# Patient Record
Sex: FEMALE | Race: WHITE
[De-identification: names, ages, dates, MRNs, and addresses within clinical notes are randomized per-mention and may not be internally consistent; named-entity substitution may affect disease eponyms.]

---

## 2020-03-15 ENCOUNTER — HOSPITAL ENCOUNTER (EMERGENCY)
Dept: HOSPITAL 62 - ER | Age: 30
Discharge: HOME | End: 2020-03-15
Payer: SELF-PAY

## 2020-03-15 VITALS — DIASTOLIC BLOOD PRESSURE: 96 MMHG | SYSTOLIC BLOOD PRESSURE: 128 MMHG

## 2020-03-15 DIAGNOSIS — R50.9: ICD-10-CM

## 2020-03-15 DIAGNOSIS — F17.200: ICD-10-CM

## 2020-03-15 DIAGNOSIS — J02.9: Primary | ICD-10-CM

## 2020-03-15 PROCEDURE — 99283 EMERGENCY DEPT VISIT LOW MDM: CPT

## 2020-03-15 PROCEDURE — 87070 CULTURE OTHR SPECIMN AEROBIC: CPT

## 2020-03-15 PROCEDURE — 96372 THER/PROPH/DIAG INJ SC/IM: CPT

## 2020-03-15 PROCEDURE — 87880 STREP A ASSAY W/OPTIC: CPT

## 2020-03-15 NOTE — ER DOCUMENT REPORT
HPI





- HPI


Patient complains to provider of: Sore throat fever


Time Seen by Provider: 03/15/20 18:54


Onset: Other - Thursday


Pain Level: 1


Context: 





29-year-old female presents emergency department with complaints of sore throat 

and fever since Thursday.  She reports she has been exposed to strep.  Denies 

vomiting reports some diarrhea.  Patient reports she is eating drinking voiding 

without problems.  Reports it hurts to swallow.  Reports taking Tylenol Motrin 

for the fever.  Reports last Tylenol was a couple hours ago and last Motrin 8 

hours ago.


Associated Symptoms: Fever, Sore throat


Exacerbated by: Denies


Relieved by: Denies


Similar symptoms previously: No


Recently seen / treated by doctor: No





- CONSTITUTIONAL


Constitutional: DENIES: Fever, Chills





- REPRODUCTIVE


Reproductive: DENIES: Pregnant:





Past Medical History





- General


Information source: Patient





- Social History


Smoking Status: Current Every Day Smoker


Chew tobacco use (# tins/day): No


Frequency of alcohol use: None


Drug Abuse: None


Lives with: Family


Family History: None


Patient has suicidal ideation: No


Patient has homicidal ideation: No





- Medical History


Medical History: Negative


Surgical Hx: Negative





Vertical Provider Document





- CONSTITUTIONAL


Agree With Documented VS: Yes


Exam Limitations: No Limitations


General Appearance: WD/WN, No Apparent Distress





- INFECTION CONTROL


TRAVEL OUTSIDE OF THE [place] IN LAST 30 DAYS: No





- HEENT


HEENT: Atraumatic, Normocephalic, Pharyngeal Erythema - No exudate good airway 

mouth wide no trismus.  negative: Conjuctival Injection, Pharyngeal Exudate, 

Tympanic Membrane Red, Tympanic Membrane Bulging





- NECK


Neck: Normal Inspection, Supple.  negative: Lymphadenopathy-Left, 

Lymphadenopathy-Right





- RESPIRATORY


Respiratory: Breath Sounds Normal, No Respiratory Distress





- CARDIOVASCULAR


Cardiovascular: Regular Rate, Regular Rhythm





- MUSCULOSKELETAL/EXTREMETIES


Musculoskeletal/Extremeties: MAEW, FROM





- NEURO


Level of Consciousness: Awake, Alert, Appropriate


Motor/Sensory: No Motor Deficit





- DERM


Integumentary: Warm, Dry, No Rash





Course





- Re-evaluation


Re-evalutation: 





03/15/20 19:02


29-year-old presents with sore throat fever for the past few days.  Reports she 

is taken Tylenol Motrin.  Reports last Tylenol was a couple hours ago and last 

Motrin was 8 hours ago.


03/15/20 20:09


Strep test was negative.  Patient was to be treated with penicillin Decadron due

to symptoms and due to recent exposure to strep.  She was instructed on this 

instructed on throat culture pending.  Patient was instructed to follow-up with 

her primary care return here for any trouble swallowing or concerns.  She 

verbalized understanding.











Laboratory











  03/15/20





  19:01


 


Group A Strep Rapid  NEGATIVE














- Vital Signs


Vital signs: 


                                        











Temp Pulse Resp BP Pulse Ox


 


 98 F   105 H  16   128/96 H  100 


 


 03/15/20 18:55  03/15/20 18:55  03/15/20 18:55  03/15/20 18:55  03/15/20 18:55














Discharge





- Discharge


Clinical Impression: 


 Fever, Sore throat





Condition: Stable


Disposition: HOME, SELF-CARE


Instructions:  Fever ([facility]), Use of Over-The-Counter Ibuprofen ([facility]), Penicillins

([facility]), Sore Throat ([facility]), Steroid Medication Injection


Additional Instructions: 


*You have been evaluated for a sore throat, fever, strep exposure


*Your strep test was negative.  A throat culture is pending.  You may be 

contacted in 3 to 4 days should you need a different antibiotic.


*You have received an injection of penicillin and steroids


*In the meantime gargle with warm salt water and suck on throat lozenges


*Change her toothbrush in 2 days.


*Do not let anyone drink/eat after you


*Good hand washing, push fluids stay well-hydrated


*Follow-up with a primary care provider within 1 week for recheck


*Return to ED for worsening condition change, needs, unable to swallow, concerns


Forms:  Elevated Blood Pressure, Return to Work

## 2020-05-21 ENCOUNTER — HOSPITAL ENCOUNTER (OUTPATIENT)
Dept: HOSPITAL 62 - RDC | Age: 30
End: 2020-05-21
Attending: NURSE PRACTITIONER
Payer: COMMERCIAL

## 2020-05-21 VITALS — DIASTOLIC BLOOD PRESSURE: 58 MMHG | SYSTOLIC BLOOD PRESSURE: 126 MMHG

## 2020-05-21 DIAGNOSIS — Z20.828: Primary | ICD-10-CM

## 2020-05-21 DIAGNOSIS — R51: ICD-10-CM

## 2020-05-21 DIAGNOSIS — R19.7: ICD-10-CM

## 2020-05-21 DIAGNOSIS — R05: ICD-10-CM

## 2020-05-21 DIAGNOSIS — R50.9: ICD-10-CM

## 2020-05-21 DIAGNOSIS — R09.89: ICD-10-CM

## 2020-05-21 DIAGNOSIS — M79.10: ICD-10-CM

## 2020-05-21 DIAGNOSIS — J06.9: ICD-10-CM

## 2020-05-21 DIAGNOSIS — J02.9: ICD-10-CM

## 2020-05-21 DIAGNOSIS — F17.200: ICD-10-CM

## 2020-05-21 DIAGNOSIS — R06.02: ICD-10-CM

## 2020-05-21 LAB
A TYPE INFLUENZA AG: NEGATIVE
B INFLUENZA AG: NEGATIVE

## 2020-05-21 PROCEDURE — 99211 OFF/OP EST MAY X REQ PHY/QHP: CPT

## 2020-05-21 PROCEDURE — 87070 CULTURE OTHR SPECIMN AEROBIC: CPT

## 2020-05-21 PROCEDURE — 87880 STREP A ASSAY W/OPTIC: CPT

## 2020-05-21 PROCEDURE — 87804 INFLUENZA ASSAY W/OPTIC: CPT

## 2020-05-21 PROCEDURE — 87635 SARS-COV-2 COVID-19 AMP PRB: CPT

## 2020-05-21 NOTE — ER RDC ASSESSMENT REPORT
Intake





- In the Last 14 days


Have you traveled outside [place]?: No


Have you been in close contact with someone CONFIRMED: Yes


Worked in Healthcare?: No





- Symptoms


Subjective Fever(Felt feverish): Yes


Chills: Yes


Muscule Aches: Yes


Runny Nose: Yes


Sore Throat: Yes


Cough (New or worsening chronic cough): Yes


Shortness of breath: Yes


Nausea or Vomiting: No


Headache: Yes


Abdominal Pain: No


Diarrhea(3 or more loose stools in last 24 hours): Yes





- Do you have any of the following


Chronic lung disease: Asthma or emphysema or COPD: No


Cystic Fibrosis: No


Diabetes: No


High Blood Pressure: No


Cardiovascular Disease: No


Chronic Kidney Disease: No


Chronic Liver Disease: No


Chronic blood disorder like Sickle Cell Disease: No


Weak immune system due to disease or medication: No


Neurologic condition that limits movement: No


Developmental delay - Moderate to Severe: No


Recent (within past 2 weeks) or current Pregnancy: No


Morbid Obesity (>100 pounds over ideal weight): No





- Objective


Temperature: 97.2 F


Pulse Rate: 88


Respiratory Rate: 18


Blood Pressure: 126/58


O2 Sat by Pulse Oximetry: 98


Objective: 


Given above, testing performed: 
































If Testing Performed:


Test Specimen Type Sent to











General





- General


Information source: Patient


Notes: 





Patient presents today RTC for testing for the coronavirus.  Patient reports 

fever, cough, chills, runny nose sore throat.  Patient states she has had some 

shortness of breath and headache pain.  Patient is a smoker.





- HPI


Onset: Other - 3 days





- Related Data


Allergies/Adverse Reactions: 


                                        





No Known Allergies Allergy (Verified 03/15/20 18:54)


   











Past Medical History





- General


Information source: Patient





- Social History


Smoking Status: Current Every Day Smoker


Family History: None





- Medical History


Medical History: Negative


Past Surgical History: Reports: Other - Skin graft





Physical Exam





- Notes


Notes: 





Full physical exam could not be performed due to covid 19 isolation protocols.





Constitutional: Nontoxic appearance, no acute distress


Eyes: Nonicteric, extraocular movements intact, sclera clear


ENT: Posterior pharynx clear, no tonsillar exudates, no tonsillar hypertrophy


Cardiovascular: Rate and rhythm regular, no JVD


Respiratory: Sounds clear bilaterally, nonlabored breathing, no use of accessory

muscles, no tachypnea


Gastrointestinal: Abdomen not distended


Muculoskeletal: Moves all extremities well


Skin: Normal color


Neuro: Awake alert oriented, normal speech


Psych: Normal mood and affect





Diagnostic Results


Laboratory Results: 


The patient was evaluated during the global Covid 19 pandemic, and that 

diagnosis was suspected/considered upon their initial presentation.  Their 

evaluation, treatment and testing was consistent with current guidelines for 

patients who present with complaints or symptoms that may be related to Covid 

19.





Patient presents with upper respiratory symptoms worrisome for possible Covid 

19.  Patient does not have emergency worrying symptoms such as difficulty 

breathing, shortness of breath, chest pain, pressure, confusion or cyanosis.  

Patient appears suitable for discharge as they are not of an advanced age, do 

not have any chronic medical conditions such as diabetes, CAD, immune 

deficiency, chronic lung disease or chronic kidney disease.  Patient's vital 

signs are stable and patient is nontoxic in appearance.  Good return precautions

have been discussed with patient, patient verbalized understanding and is 

agreeable with discharge plan of care at this time.





Patient Education/Counseling


Counseling/Education: 





Patient was provided with discharge information including:





As a person under investigation for Covid 19, the [place] department of 

Health and Human Services, division of public health advises you to adhere to 

the following guidance until your test results are reported to you.  If your 

test result is positive, you will receive additional information from your 

provider and your local health department at that time.





Remain at home until you are cleared by the health provider or public health 

authorities.





Keep a log of visitors to your home, notify any visitors to your home of your is

olation status.





If you plan to move to a new address or leave the [facility], notify the local 

health department in your County.





Call your doctor or seek care if you have an urgent medical need.  Before 

seeking medical care, call ahead to get instructions from the provider before 

arriving at the medical office clinic or hospital.  Notify them that you are 

being tested for the virus that causes Covid 19 so that arrangements can be 

made, as necessary, to prevent transmission to others in the healthcare setting.

 Next, notify the local health department in your county.





If a medical emergency arises and you need to call 911, inform the first 

responders that you are being tested for the virus that causes Covid 19.  Next, 

notify the local health department in your county.





RDC Discharge





- Discharge


Clinical Impression: 


 COVID-19 screening





Upper respiratory infection


Qualifiers:


 URI type: unspecified URI Qualified Code(s): J06.9 - Acute upper respiratory 

infection, unspecified





Condition: Stable


Disposition: Home; Selfcare